# Patient Record
Sex: MALE | Race: OTHER
[De-identification: names, ages, dates, MRNs, and addresses within clinical notes are randomized per-mention and may not be internally consistent; named-entity substitution may affect disease eponyms.]

---

## 2017-06-09 ENCOUNTER — HOSPITAL ENCOUNTER (EMERGENCY)
Dept: HOSPITAL 15 - ER | Age: 42
Discharge: HOME | End: 2017-06-09
Payer: SELF-PAY

## 2017-06-09 VITALS — WEIGHT: 135 LBS | HEIGHT: 64 IN | BODY MASS INDEX: 23.05 KG/M2

## 2017-06-09 VITALS — DIASTOLIC BLOOD PRESSURE: 89 MMHG | SYSTOLIC BLOOD PRESSURE: 150 MMHG

## 2017-06-09 DIAGNOSIS — K31.84: ICD-10-CM

## 2017-06-09 DIAGNOSIS — E11.65: ICD-10-CM

## 2017-06-09 DIAGNOSIS — L03.116: Primary | ICD-10-CM

## 2017-06-09 LAB
ALBUMIN SERPL-MCNC: 3.4 G/DL (ref 3.4–5)
ALP SERPL-CCNC: 126 U/L (ref 45–117)
ANION GAP SERPL CALCULATED.3IONS-SCNC: 11 MMOL/L (ref 5–15)
APTT PPP: 26.2 SEC (ref 22.64–33.71)
BILIRUB SERPL-MCNC: 0.3 MG/DL (ref 0.2–1)
BNP SERPL-MCNC: 16.75 PG/ML (ref 0–100)
BUN SERPL-MCNC: 15 MG/DL (ref 7–18)
BUN/CREAT SERPL: 18.5
CALCIUM SERPL-MCNC: 8.1 MG/DL (ref 8.5–10.1)
CHLORIDE SERPL-SCNC: 99 MMOL/L (ref 98–107)
CO2 SERPL-SCNC: 28 MMOL/L (ref 21–32)
GLUCOSE SERPL-MCNC: 376 MG/DL (ref 74–106)
HCT VFR BLD AUTO: 36.1 % (ref 41–53)
HGB BLD-MCNC: 12.1 G/DL (ref 13.5–17.5)
INR PPP: 0.95 (ref 0.9–1.15)
MAGNESIUM SERPL-MCNC: 2.2 MG/DL (ref 1.6–2.6)
MCH RBC QN AUTO: 30.5 PG (ref 28–32)
MCV RBC AUTO: 91.1 FL (ref 80–100)
NRBC BLD QL AUTO: 0 %
POTASSIUM SERPL-SCNC: 4 MMOL/L (ref 3.5–5.1)
PROT SERPL-MCNC: 6.6 G/DL (ref 6.4–8.2)
PROTHROMBIN TIME: 10.3 SEC (ref 9.37–12.3)
SODIUM SERPL-SCNC: 138 MMOL/L (ref 136–145)
TEMPERATURE:: 22.5 C (ref 20–25)

## 2017-06-09 PROCEDURE — 85025 COMPLETE CBC W/AUTO DIFF WBC: CPT

## 2017-06-09 PROCEDURE — 83605 ASSAY OF LACTIC ACID: CPT

## 2017-06-09 PROCEDURE — 36415 COLL VENOUS BLD VENIPUNCTURE: CPT

## 2017-06-09 PROCEDURE — 71010: CPT

## 2017-06-09 PROCEDURE — 93971 EXTREMITY STUDY: CPT

## 2017-06-09 PROCEDURE — 85730 THROMBOPLASTIN TIME PARTIAL: CPT

## 2017-06-09 PROCEDURE — 83880 ASSAY OF NATRIURETIC PEPTIDE: CPT

## 2017-06-09 PROCEDURE — 84484 ASSAY OF TROPONIN QUANT: CPT

## 2017-06-09 PROCEDURE — 94761 N-INVAS EAR/PLS OXIMETRY MLT: CPT

## 2017-06-09 PROCEDURE — 85610 PROTHROMBIN TIME: CPT

## 2017-06-09 PROCEDURE — 82962 GLUCOSE BLOOD TEST: CPT

## 2017-06-09 PROCEDURE — 96376 TX/PRO/DX INJ SAME DRUG ADON: CPT

## 2017-06-09 PROCEDURE — 99285 EMERGENCY DEPT VISIT HI MDM: CPT

## 2017-06-09 PROCEDURE — 96366 THER/PROPH/DIAG IV INF ADDON: CPT

## 2017-06-09 PROCEDURE — 74176 CT ABD & PELVIS W/O CONTRAST: CPT

## 2017-06-09 PROCEDURE — 96375 TX/PRO/DX INJ NEW DRUG ADDON: CPT

## 2017-06-09 PROCEDURE — 80053 COMPREHEN METABOLIC PANEL: CPT

## 2017-06-09 PROCEDURE — 96365 THER/PROPH/DIAG IV INF INIT: CPT

## 2017-06-09 PROCEDURE — 83735 ASSAY OF MAGNESIUM: CPT

## 2017-06-09 PROCEDURE — 87040 BLOOD CULTURE FOR BACTERIA: CPT

## 2020-03-16 ENCOUNTER — HOSPITAL ENCOUNTER (INPATIENT)
Dept: HOSPITAL 15 - ER | Age: 45
LOS: 2 days | Discharge: ANOTHER HOSPITAL | DRG: 196 | End: 2020-03-18
Attending: INTERNAL MEDICINE | Admitting: NURSE PRACTITIONER
Payer: MEDICAID

## 2020-03-16 VITALS — SYSTOLIC BLOOD PRESSURE: 71 MMHG | DIASTOLIC BLOOD PRESSURE: 49 MMHG

## 2020-03-16 VITALS — HEIGHT: 69 IN | WEIGHT: 100 LBS | BODY MASS INDEX: 14.81 KG/M2

## 2020-03-16 DIAGNOSIS — M86.8X7: ICD-10-CM

## 2020-03-16 DIAGNOSIS — K31.84: ICD-10-CM

## 2020-03-16 DIAGNOSIS — E11.649: ICD-10-CM

## 2020-03-16 DIAGNOSIS — G93.1: ICD-10-CM

## 2020-03-16 DIAGNOSIS — Z74.01: ICD-10-CM

## 2020-03-16 DIAGNOSIS — G61.0: ICD-10-CM

## 2020-03-16 DIAGNOSIS — I46.9: Primary | ICD-10-CM

## 2020-03-16 DIAGNOSIS — N17.0: ICD-10-CM

## 2020-03-16 DIAGNOSIS — L97.529: ICD-10-CM

## 2020-03-16 DIAGNOSIS — L97.523: ICD-10-CM

## 2020-03-16 DIAGNOSIS — E11.22: ICD-10-CM

## 2020-03-16 DIAGNOSIS — D63.1: ICD-10-CM

## 2020-03-16 DIAGNOSIS — E43: ICD-10-CM

## 2020-03-16 DIAGNOSIS — E11.621: ICD-10-CM

## 2020-03-16 DIAGNOSIS — J96.01: ICD-10-CM

## 2020-03-16 DIAGNOSIS — Z66: ICD-10-CM

## 2020-03-16 DIAGNOSIS — A04.72: ICD-10-CM

## 2020-03-16 DIAGNOSIS — N18.6: ICD-10-CM

## 2020-03-16 DIAGNOSIS — E11.43: ICD-10-CM

## 2020-03-16 DIAGNOSIS — J18.9: ICD-10-CM

## 2020-03-16 DIAGNOSIS — E11.65: ICD-10-CM

## 2020-03-16 DIAGNOSIS — G37.3: ICD-10-CM

## 2020-03-16 DIAGNOSIS — Z99.11: ICD-10-CM

## 2020-03-16 LAB
ALBUMIN SERPL-MCNC: 0.9 G/DL (ref 3.4–5)
ALP SERPL-CCNC: 94 U/L (ref 45–117)
ALT SERPL-CCNC: 16 U/L (ref 16–61)
ANION GAP SERPL CALCULATED.3IONS-SCNC: 13 MMOL/L (ref 5–15)
APTT PPP: 46.5 SEC (ref 23.64–32.05)
BILIRUB SERPL-MCNC: 0.6 MG/DL (ref 0.2–1)
BUN SERPL-MCNC: 119 MG/DL (ref 7–18)
BUN/CREAT SERPL: 39.8
CALCIUM SERPL-MCNC: 7.5 MG/DL (ref 8.5–10.1)
CHLORIDE SERPL-SCNC: 103 MMOL/L (ref 98–107)
CO2 SERPL-SCNC: 23 MMOL/L (ref 21–32)
GLUCOSE SERPL-MCNC: 255 MG/DL (ref 74–106)
HCT VFR BLD AUTO: 23.6 % (ref 41–53)
HGB BLD-MCNC: 8.3 G/DL (ref 13.5–17.5)
INR PPP: 1.8 (ref 0.9–1.15)
MAGNESIUM SERPL-MCNC: 2.7 MG/DL (ref 1.6–2.6)
MCH RBC QN AUTO: 30.9 PG (ref 28–32)
MCV RBC AUTO: 87.9 FL (ref 80–100)
NRBC BLD QL AUTO: 0.1 %
POTASSIUM SERPL-SCNC: 4.2 MMOL/L (ref 3.5–5.1)
PROT SERPL-MCNC: 3.2 G/DL (ref 6.4–8.2)
SODIUM SERPL-SCNC: 139 MMOL/L (ref 136–145)

## 2020-03-16 PROCEDURE — 87205 SMEAR GRAM STAIN: CPT

## 2020-03-16 PROCEDURE — 96361 HYDRATE IV INFUSION ADD-ON: CPT

## 2020-03-16 PROCEDURE — 74176 CT ABD & PELVIS W/O CONTRAST: CPT

## 2020-03-16 PROCEDURE — 94002 VENT MGMT INPAT INIT DAY: CPT

## 2020-03-16 PROCEDURE — 83880 ASSAY OF NATRIURETIC PEPTIDE: CPT

## 2020-03-16 PROCEDURE — 85379 FIBRIN DEGRADATION QUANT: CPT

## 2020-03-16 PROCEDURE — 80053 COMPREHEN METABOLIC PANEL: CPT

## 2020-03-16 PROCEDURE — 96365 THER/PROPH/DIAG IV INF INIT: CPT

## 2020-03-16 PROCEDURE — 5A12012 PERFORMANCE OF CARDIAC OUTPUT, SINGLE, MANUAL: ICD-10-PCS

## 2020-03-16 PROCEDURE — 83735 ASSAY OF MAGNESIUM: CPT

## 2020-03-16 PROCEDURE — 82962 GLUCOSE BLOOD TEST: CPT

## 2020-03-16 PROCEDURE — 36415 COLL VENOUS BLD VENIPUNCTURE: CPT

## 2020-03-16 PROCEDURE — 82805 BLOOD GASES W/O2 SATURATION: CPT

## 2020-03-16 PROCEDURE — 94003 VENT MGMT INPAT SUBQ DAY: CPT

## 2020-03-16 PROCEDURE — 87186 SC STD MICRODIL/AGAR DIL: CPT

## 2020-03-16 PROCEDURE — 73700 CT LOWER EXTREMITY W/O DYE: CPT

## 2020-03-16 PROCEDURE — 5A1945Z RESPIRATORY VENTILATION, 24-96 CONSECUTIVE HOURS: ICD-10-PCS | Performed by: INTERNAL MEDICINE

## 2020-03-16 PROCEDURE — 36600 WITHDRAWAL OF ARTERIAL BLOOD: CPT

## 2020-03-16 PROCEDURE — 0BH17EZ INSERTION OF ENDOTRACHEAL AIRWAY INTO TRACHEA, VIA NATURAL OR ARTIFICIAL OPENING: ICD-10-PCS | Performed by: INTERNAL MEDICINE

## 2020-03-16 PROCEDURE — 87077 CULTURE AEROBIC IDENTIFY: CPT

## 2020-03-16 PROCEDURE — 99291 CRITICAL CARE FIRST HOUR: CPT

## 2020-03-16 PROCEDURE — 70450 CT HEAD/BRAIN W/O DYE: CPT

## 2020-03-16 PROCEDURE — 71045 X-RAY EXAM CHEST 1 VIEW: CPT

## 2020-03-16 PROCEDURE — 84484 ASSAY OF TROPONIN QUANT: CPT

## 2020-03-16 PROCEDURE — 96375 TX/PRO/DX INJ NEW DRUG ADDON: CPT

## 2020-03-16 PROCEDURE — 87081 CULTURE SCREEN ONLY: CPT

## 2020-03-16 PROCEDURE — 85610 PROTHROMBIN TIME: CPT

## 2020-03-16 PROCEDURE — 85025 COMPLETE CBC W/AUTO DIFF WBC: CPT

## 2020-03-16 PROCEDURE — 93005 ELECTROCARDIOGRAM TRACING: CPT

## 2020-03-16 PROCEDURE — 84443 ASSAY THYROID STIM HORMONE: CPT

## 2020-03-16 PROCEDURE — 87070 CULTURE OTHR SPECIMN AEROBIC: CPT

## 2020-03-16 PROCEDURE — 71250 CT THORAX DX C-: CPT

## 2020-03-16 PROCEDURE — 85730 THROMBOPLASTIN TIME PARTIAL: CPT

## 2020-03-16 RX ADMIN — NOREPINEPHRINE BITARTRATE SCH MLS/HR: 1 INJECTION, SOLUTION, CONCENTRATE INTRAVENOUS at 22:15

## 2020-03-16 NOTE — NUR
Respiratory note:

PLACED PT ON VENT V19, VENT CONNECTED TO RED OUTLET AND O2 SOURCE. ALARMS ARE SET AND 
AUDIBLE AMBU BAG AND MASK AT BEDSIDE. BS ARE FINE COURSE T/O, SXD FOR LARGE AMOUNT OF THICK 
TAN W/ BLOOD TINGE. SPUTUM SAMPLE OBTAINED AND SENT TO LAB. WILL CONTINUE TO MONITOR.

## 2020-03-17 VITALS — SYSTOLIC BLOOD PRESSURE: 69 MMHG | DIASTOLIC BLOOD PRESSURE: 44 MMHG

## 2020-03-17 VITALS — SYSTOLIC BLOOD PRESSURE: 112 MMHG | DIASTOLIC BLOOD PRESSURE: 73 MMHG

## 2020-03-17 VITALS — SYSTOLIC BLOOD PRESSURE: 109 MMHG | DIASTOLIC BLOOD PRESSURE: 72 MMHG

## 2020-03-17 VITALS — DIASTOLIC BLOOD PRESSURE: 71 MMHG | SYSTOLIC BLOOD PRESSURE: 115 MMHG

## 2020-03-17 VITALS — DIASTOLIC BLOOD PRESSURE: 77 MMHG | SYSTOLIC BLOOD PRESSURE: 113 MMHG

## 2020-03-17 VITALS — SYSTOLIC BLOOD PRESSURE: 119 MMHG | DIASTOLIC BLOOD PRESSURE: 84 MMHG

## 2020-03-17 VITALS — SYSTOLIC BLOOD PRESSURE: 116 MMHG | DIASTOLIC BLOOD PRESSURE: 74 MMHG

## 2020-03-17 VITALS — SYSTOLIC BLOOD PRESSURE: 107 MMHG | DIASTOLIC BLOOD PRESSURE: 72 MMHG

## 2020-03-17 VITALS — DIASTOLIC BLOOD PRESSURE: 69 MMHG | SYSTOLIC BLOOD PRESSURE: 106 MMHG

## 2020-03-17 VITALS — DIASTOLIC BLOOD PRESSURE: 44 MMHG | SYSTOLIC BLOOD PRESSURE: 69 MMHG

## 2020-03-17 VITALS — DIASTOLIC BLOOD PRESSURE: 66 MMHG | SYSTOLIC BLOOD PRESSURE: 111 MMHG

## 2020-03-17 RX ADMIN — DEXTROSE AND SODIUM CHLORIDE SCH MLS/HR: 5; 450 INJECTION, SOLUTION INTRAVENOUS at 12:10

## 2020-03-17 RX ADMIN — NOREPINEPHRINE BITARTRATE SCH MLS/HR: 1 INJECTION, SOLUTION, CONCENTRATE INTRAVENOUS at 22:15

## 2020-03-17 RX ADMIN — HUMAN INSULIN SCH UNITS: 100 INJECTION, SOLUTION SUBCUTANEOUS at 11:50

## 2020-03-17 RX ADMIN — HUMAN INSULIN SCH UNITS: 100 INJECTION, SOLUTION SUBCUTANEOUS at 18:32

## 2020-03-17 RX ADMIN — Medication SCH STRIP: at 11:50

## 2020-03-17 RX ADMIN — Medication SCH STRIP: at 18:32

## 2020-03-17 NOTE — NUR
AIR MATTRESS:

Air bed ordered at [facility],Reference # [id number]; ETA 03/17/20 anytime.[facility] delivery at 
facility right now, informed need of bed. Call [facility] if need to follow up at [phone]

-------------------------------------------------------------------------------

Addendum: 03/17/20 at 1227 by iLlly Barreto RN

-------------------------------------------------------------------------------

Amended: Links added.

## 2020-03-17 NOTE — NUR
Respiratory note:

AT BEDSIDE FOR ROUTINE VENT CHECK  RR AND FIO2 WHERE CHANGED POST ABG 

RESULTS. RN JUAN R AWARE OF CHANGES. PTS CURRENT TEMP IS 93.7F. WARMING MEASURES HAVE BEEN 
TAKEN.

## 2020-03-17 NOTE — NUR
Respiratory note:

PT TRANSPORTED TO CT, VENTILATED VIA AMBU BAG DURING TRANSPORT. PT PLACED ON CT VENT DURING 
SCAN. PT TRANSPORTED BACK TO ER, VENTILATED VIA AMBU BAG. TRANSPORT WENT WITHOUT INCIDENT. 
PT PLACED BACK ON VENT #V19, PLUGGED INTO A RED OUTLET AND PROPER O2 SOURCE. AMBU BAG/MASK 
AT BEDSIDE.  RN AT BEDSIDE.

## 2020-03-18 VITALS — SYSTOLIC BLOOD PRESSURE: 142 MMHG | DIASTOLIC BLOOD PRESSURE: 89 MMHG

## 2020-03-18 VITALS — DIASTOLIC BLOOD PRESSURE: 73 MMHG | SYSTOLIC BLOOD PRESSURE: 112 MMHG

## 2020-03-18 VITALS — DIASTOLIC BLOOD PRESSURE: 63 MMHG | SYSTOLIC BLOOD PRESSURE: 99 MMHG

## 2020-03-18 VITALS — SYSTOLIC BLOOD PRESSURE: 93 MMHG | DIASTOLIC BLOOD PRESSURE: 62 MMHG

## 2020-03-18 VITALS — SYSTOLIC BLOOD PRESSURE: 103 MMHG | DIASTOLIC BLOOD PRESSURE: 66 MMHG

## 2020-03-18 VITALS — DIASTOLIC BLOOD PRESSURE: 79 MMHG | SYSTOLIC BLOOD PRESSURE: 133 MMHG

## 2020-03-18 VITALS — SYSTOLIC BLOOD PRESSURE: 123 MMHG | DIASTOLIC BLOOD PRESSURE: 80 MMHG

## 2020-03-18 VITALS — DIASTOLIC BLOOD PRESSURE: 79 MMHG | SYSTOLIC BLOOD PRESSURE: 115 MMHG

## 2020-03-18 VITALS — DIASTOLIC BLOOD PRESSURE: 60 MMHG | SYSTOLIC BLOOD PRESSURE: 93 MMHG

## 2020-03-18 VITALS — SYSTOLIC BLOOD PRESSURE: 101 MMHG | DIASTOLIC BLOOD PRESSURE: 66 MMHG

## 2020-03-18 LAB
ALBUMIN SERPL-MCNC: 1.1 G/DL (ref 3.4–5)
ALP SERPL-CCNC: 131 U/L (ref 45–117)
ALT SERPL-CCNC: 20 U/L (ref 16–61)
ANION GAP SERPL CALCULATED.3IONS-SCNC: 14 MMOL/L (ref 5–15)
APTT PPP: 41.5 SEC (ref 23.64–32.05)
BILIRUB SERPL-MCNC: 0.8 MG/DL (ref 0.2–1)
BUN SERPL-MCNC: 124 MG/DL (ref 7–18)
BUN/CREAT SERPL: 38.6
CALCIUM SERPL-MCNC: 7.7 MG/DL (ref 8.5–10.1)
CHLORIDE SERPL-SCNC: 102 MMOL/L (ref 98–107)
CO2 SERPL-SCNC: 22 MMOL/L (ref 21–32)
GLUCOSE SERPL-MCNC: 178 MG/DL (ref 74–106)
INR PPP: 1.88 (ref 0.9–1.15)
POTASSIUM SERPL-SCNC: 5 MMOL/L (ref 3.5–5.1)
PROT SERPL-MCNC: 4.1 G/DL (ref 6.4–8.2)
SODIUM SERPL-SCNC: 138 MMOL/L (ref 136–145)

## 2020-03-18 RX ADMIN — Medication SCH STRIP: at 12:08

## 2020-03-18 RX ADMIN — DEXTROSE AND SODIUM CHLORIDE SCH MLS/HR: 5; 450 INJECTION, SOLUTION INTRAVENOUS at 01:32

## 2020-03-18 RX ADMIN — HUMAN INSULIN SCH UNITS: 100 INJECTION, SOLUTION SUBCUTANEOUS at 12:00

## 2020-03-18 RX ADMIN — HUMAN INSULIN SCH UNITS: 100 INJECTION, SOLUTION SUBCUTANEOUS at 00:00

## 2020-03-18 RX ADMIN — Medication SCH STRIP: at 00:45

## 2020-03-18 RX ADMIN — Medication SCH STRIP: at 06:33

## 2020-03-18 RX ADMIN — DEXTROSE AND SODIUM CHLORIDE SCH MLS/HR: 5; 450 INJECTION, SOLUTION INTRAVENOUS at 12:55

## 2020-03-18 RX ADMIN — HUMAN INSULIN SCH UNITS: 100 INJECTION, SOLUTION SUBCUTANEOUS at 06:44

## 2020-03-18 NOTE — NUR
I called [facility]  Risa regarding this patient being transferred to [place]-faxed 
her clinical information as requested-she will review it and give me a call back to let me 
know how to proceed.

## 2020-03-18 NOTE — NUR
D/C Planning 





Per  consult for [facility]. Order was faxed to [facility] and [facility]. Per María Elena with [facility] 
patient will be going to [facility] in [place] Ph: [phone]) to room 502 under [facility] 
group. Per María Elena patient can arrived to facility after 18:00. Placed followed up called to 
[facility], spoke to Luis Alfredo. Advised Luis Alfredo patient is on a VENT with [facility] and will need a 
nurse during transportation to facility. Requested for  to be at 18:00. Per Luis Alfredo 
they can do a 17:30 . NERISSA Gomez from the ER was informed of d/c plan.

## 2020-03-18 NOTE — NUR
WOUND CARE NOTE: PATIENT ADMITTED TO [facility] WITH DIAGNOSIS OF S/P CPR.  HE IS NOTED TO HAVE 
KEIRA SCORE OF 6.  HE IS RESTING ON SPECIALTY AIR BED. PATIENT INTUBATED, NON RESPONSIVE. 
WOUND PHOTOS WERE TAKEN UPON ADMIT BY BEDSIDE NURSE, WOUND CONSULT ORDERED.  HE IS AWAITING 
TRANSFER TO [facility] PER MD ORDER.  PATIENT HAS A MULTITUDE OF WOUNDS INCLUDING DTI/UNSTAGEABLE 
PRESSURE ULCERS TO SACRUM, BLE, LEFT HAND, AND BACK.  HE ALSO NOTED TO HAVE NECROTIC DFU 
ULCERS TO BILATERAL FEET, SEVERE MASD WITH SKIN EROSION TO SACRUM/BUTTOCKS/PERINEUM/SCROTAL 
SKIN.  ALL WOUNDS MEASURED, PHOTOGRAPHED, CLEANSED, AND DRESSED AT THIS TIME. ALL WOUND 
STATS CAN BE FOUND WITHIN WOUND ASSESSMENT LINKED TO THIS NOTE.  PATIENT REPOSITIONED ONTO 
SUPINE POSITION AT THIS TIME.  



AS PATIENT WILL BE TRANSFERRING OUT, NO WOUND ORDERS WILL BE PLACED.  RECOMMEND UNTIL 
DISCHARGE: FREQUENT TURN SCHEDULE Q 2 HOURS, PRN AS CONDITION PERMITS, WITH PRESSURE 
REDISTRIBUTION USING PILLOWS/WEDGES, BID/PRN APPLICATION ZGUARD TO ALL SKIN EROSION OF 
SACRUM/BUTTOCKS/SCROTUM, CONTINUATION WITH SPECIALTY AIR BED, SKIN/WOUND CARE PLAN.

-------------------------------------------------------------------------------

Addendum: 03/18/20 at 1840 by Lissette Reid RN

-------------------------------------------------------------------------------

Amended: Links added.

## 2020-03-18 NOTE — NUR
I spoke with Lucas at [facility], I let him know that this patient does need a continuous cardiac 
monitor, everything is set for ETA 1830.

## 2020-03-18 NOTE — NUR
I received a message from Risa at [facility] letting me know that patient can be transferred to 
[facility]-auth number for [facility] is [id number], auth for [facility] is [id number].